# Patient Record
Sex: FEMALE | Race: WHITE | Employment: UNEMPLOYED | URBAN - METROPOLITAN AREA
[De-identification: names, ages, dates, MRNs, and addresses within clinical notes are randomized per-mention and may not be internally consistent; named-entity substitution may affect disease eponyms.]

---
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
## 2025-06-27 ENCOUNTER — HOSPITAL ENCOUNTER (EMERGENCY)
Age: 20
Discharge: HOME | End: 2025-06-28
Attending: STUDENT IN AN ORGANIZED HEALTH CARE EDUCATION/TRAINING PROGRAM
Payer: COMMERCIAL

## 2025-06-27 ENCOUNTER — APPOINTMENT (OUTPATIENT)
Dept: GENERAL RADIOLOGY | Age: 20
End: 2025-06-27
Payer: COMMERCIAL

## 2025-06-27 DIAGNOSIS — S83.004A CLOSED DISLOCATION OF RIGHT PATELLA, INITIAL ENCOUNTER: Primary | ICD-10-CM

## 2025-06-27 PROCEDURE — 99283 EMERGENCY DEPT VISIT LOW MDM: CPT

## 2025-06-27 PROCEDURE — 6360000002 HC RX W HCPCS

## 2025-06-27 PROCEDURE — 27562 TREAT KNEECAP DISLOCATION: CPT

## 2025-06-27 PROCEDURE — 96372 THER/PROPH/DIAG INJ SC/IM: CPT

## 2025-06-27 PROCEDURE — 73562 X-RAY EXAM OF KNEE 3: CPT

## 2025-06-27 PROCEDURE — 73502 X-RAY EXAM HIP UNI 2-3 VIEWS: CPT

## 2025-06-27 PROCEDURE — 99284 EMERGENCY DEPT VISIT MOD MDM: CPT

## 2025-06-27 RX ORDER — KETOROLAC TROMETHAMINE 30 MG/ML
30 INJECTION, SOLUTION INTRAMUSCULAR; INTRAVENOUS ONCE
Status: COMPLETED | OUTPATIENT
Start: 2025-06-27 | End: 2025-06-27

## 2025-06-27 RX ORDER — FENTANYL CITRATE 0.05 MG/ML
50 INJECTION, SOLUTION INTRAMUSCULAR; INTRAVENOUS ONCE
Status: DISCONTINUED | OUTPATIENT
Start: 2025-06-27 | End: 2025-06-27

## 2025-06-27 RX ADMIN — KETOROLAC TROMETHAMINE 30 MG: 30 INJECTION, SOLUTION INTRAMUSCULAR at 23:24

## 2025-06-27 ASSESSMENT — LIFESTYLE VARIABLES
HOW MANY STANDARD DRINKS CONTAINING ALCOHOL DO YOU HAVE ON A TYPICAL DAY: PATIENT DOES NOT DRINK
HOW OFTEN DO YOU HAVE A DRINK CONTAINING ALCOHOL: NEVER

## 2025-06-27 ASSESSMENT — PAIN SCALES - GENERAL: PAINLEVEL_OUTOF10: 10

## 2025-06-27 ASSESSMENT — PAIN DESCRIPTION - ORIENTATION: ORIENTATION: RIGHT

## 2025-06-27 ASSESSMENT — PAIN DESCRIPTION - LOCATION: LOCATION: KNEE

## 2025-06-27 ASSESSMENT — PAIN DESCRIPTION - DESCRIPTORS: DESCRIPTORS: DISCOMFORT;TIGHTNESS

## 2025-06-28 ENCOUNTER — APPOINTMENT (OUTPATIENT)
Dept: GENERAL RADIOLOGY | Age: 20
End: 2025-06-28
Payer: COMMERCIAL

## 2025-06-28 VITALS
SYSTOLIC BLOOD PRESSURE: 128 MMHG | HEIGHT: 61 IN | BODY MASS INDEX: 33.99 KG/M2 | WEIGHT: 180 LBS | TEMPERATURE: 97.8 F | OXYGEN SATURATION: 100 % | RESPIRATION RATE: 18 BRPM | DIASTOLIC BLOOD PRESSURE: 64 MMHG | HEART RATE: 78 BPM

## 2025-06-28 PROCEDURE — 6370000000 HC RX 637 (ALT 250 FOR IP)

## 2025-06-28 PROCEDURE — 73562 X-RAY EXAM OF KNEE 3: CPT

## 2025-06-28 PROCEDURE — 27562 TREAT KNEECAP DISLOCATION: CPT

## 2025-06-28 PROCEDURE — 96372 THER/PROPH/DIAG INJ SC/IM: CPT

## 2025-06-28 PROCEDURE — 6360000002 HC RX W HCPCS

## 2025-06-28 RX ORDER — MIDAZOLAM HYDROCHLORIDE 5 MG/ML
5 INJECTION, SOLUTION INTRAMUSCULAR; INTRAVENOUS ONCE
Status: COMPLETED | OUTPATIENT
Start: 2025-06-28 | End: 2025-06-28

## 2025-06-28 RX ORDER — FENTANYL CITRATE 50 UG/ML
INJECTION, SOLUTION INTRAMUSCULAR; INTRAVENOUS
Status: DISCONTINUED
Start: 2025-06-28 | End: 2025-06-28 | Stop reason: HOSPADM

## 2025-06-28 RX ORDER — ONDANSETRON 4 MG/1
4 TABLET, ORALLY DISINTEGRATING ORAL ONCE
Status: COMPLETED | OUTPATIENT
Start: 2025-06-28 | End: 2025-06-28

## 2025-06-28 RX ORDER — FENTANYL CITRATE 0.05 MG/ML
50 INJECTION, SOLUTION INTRAMUSCULAR; INTRAVENOUS ONCE
Status: COMPLETED | OUTPATIENT
Start: 2025-06-28 | End: 2025-06-28

## 2025-06-28 RX ADMIN — ONDANSETRON 4 MG: 4 TABLET, ORALLY DISINTEGRATING ORAL at 01:13

## 2025-06-28 RX ADMIN — FENTANYL CITRATE 50 MCG: 50 INJECTION INTRAMUSCULAR; INTRAVENOUS at 00:51

## 2025-06-28 RX ADMIN — MIDAZOLAM 5 MG: 5 INJECTION INTRAMUSCULAR; INTRAVENOUS at 01:13

## 2025-06-28 ASSESSMENT — PAIN DESCRIPTION - LOCATION
LOCATION: KNEE
LOCATION: KNEE

## 2025-06-28 ASSESSMENT — PAIN SCALES - GENERAL
PAINLEVEL_OUTOF10: 10
PAINLEVEL_OUTOF10: 2

## 2025-06-28 ASSESSMENT — PAIN DESCRIPTION - DESCRIPTORS: DESCRIPTORS: DISCOMFORT

## 2025-06-28 ASSESSMENT — PAIN - FUNCTIONAL ASSESSMENT
PAIN_FUNCTIONAL_ASSESSMENT: PREVENTS OR INTERFERES SOME ACTIVE ACTIVITIES AND ADLS
PAIN_FUNCTIONAL_ASSESSMENT: PREVENTS OR INTERFERES WITH ALL ACTIVE AND SOME PASSIVE ACTIVITIES

## 2025-06-28 ASSESSMENT — PAIN DESCRIPTION - ORIENTATION
ORIENTATION: RIGHT
ORIENTATION: RIGHT

## 2025-06-28 NOTE — ED NOTES
Safe EnvironmentArm Bands On: Fall; IDPatient has limb restriction?: NoSafety Measures: Standard Safety Measures; Bed/Chair alarm on; Bed/Chair-Wheels locked; Bed in low position; Call light within reach; Overbed table w/in reach; Gripper socks; Side rails X 2; Caregiver at bedside  Fall Risk InterventionsToilet Every 2 Hours-In Advance of Need: YesHourly Visual Checks: In bed; Agitated; AwakeRoom Door Open: YesPatient Moved Closer to Nursing Station: NoFall Visual Posted: Armband; Fall sign posted; SocksAlarm On: BedNursing Judgement-Fall Risk High(Add Comments): Yes  PrecautionsPrecautions: Fall riskNegative Pressure Room: NoPositive Pressure Room: No  Family/Significant Other CommunicationFamily/Significant Other Update: Updated; Visiting  Comfort and Environment InterventionsComfort: Lights dim  Safety CompanionAlternatives to Sitter: Increased Frequency of Nursing Rounds; Decrease Stimulation; Comfort Measures; Eliminate Invasive Treatment  Restart TimerAutomatic Restart Rounding Timer: Yes  NutritionNPO: NoFeeding: Able to feed self - Independent  TelemetryCardiac/Telemetry Monitor On: No  MobilityActivity: In bedHead of Bed Elevated : Self regulatedTurned/Repositioned: Turns self

## 2025-06-28 NOTE — DISCHARGE INSTRUCTIONS
XR KNEE RIGHT (3 VIEWS)   Final Result   1. Lateral subluxation of the patella.   2. Large knee joint effusion.         XR KNEE RIGHT (3 VIEWS)   Final Result   Lateral patellar dislocation.      RECOMMENDATIONS:   Post reduction views.         XR HIP RIGHT (2-3 VIEWS)   Final Result   No acute abnormality of the hip.         -Alternate between 600 mg of ibuprofen and 1000 mg of Tylenol every 4 hours  - Return to the emergency department with any new or concerning symptoms  - Follow-up with orthopedics as soon as possible for reevaluation.

## 2025-06-28 NOTE — ED NOTES
Ace bandage and knee immobilizer applied.  Learning assessment done; pt educated on use of knee immobilizer and crutches - able to teach back and demonstrate safe use.

## 2025-06-28 NOTE — ED NOTES
Patient verbally aggressive with staff, using profanity and screaming that no one is going to touch her leg; pt is refusing care at this time - provider aware.

## 2025-06-28 NOTE — ED NOTES
Patient continues to be verbally aggressive with staff, using profanity and screaming that no one is going to touch her leg; pt refusing X-rays  - staff continues to try and educate the patient and her mother on the risks of waiting to treat the patient's injury. Provider aware of patient's behavioral issues.

## 2025-06-28 NOTE — ED PROVIDER NOTES
Shared SMITA-ED Attending Visit.  CC: No     [facility]  Department of Emergency Medicine   ED  Encounter Note  Admit Date/RoomTime: 2025 10:04 PM  ED Room:     NAME: Tamar Montero  : 2005  MRN: [id number]     Chief Complaint:  Fall (Patient tripped over dog leash at home and injury her right knee, patient admits to dislocating her knee in the past)    History of Present Illness       Tamar Montero is a 19 y.o. old female who presents to the emergency department by private vehicle, for traumatic pain located to right knee  which occured several minute(s) prior to arrival.  Complaint is due to a twisting and hyperextension injury.  Patient fell over a dog leash at home.  She has dislocated this knee in the past.  She feels that she once again dislocated knee.  She states that she straightened out her leg at home and kneecap did straighten out a little bit but is still off to the side.  Her pain is aggraveated by any movement or pressure on or palpation of painful area and relieved by nothing, as no treatment has been provided prior to this visit. Her weight bearing ability is: not possible secondary to pain. She denies any head injury, headache, loss of consciousness, confusion, dizziness, neck pain, chest pain, abdominal pain, back pain, numbness, weakness, blurred vision, nausea, vomiting, fever, chills, wounds, or rash.  On assessment, patient is in pain, she is nontoxic-appearing.  GCS is 15.    ROS   Pertinent positives and negatives are stated within HPI, all other systems reviewed and are negative.    Past Medical History:  has a past medical history of Anxiety.    Surgical History:  has no past surgical history on file.    Social History:  reports that she has never smoked. She has never used smokeless tobacco. She reports that she does not drink alcohol and does not use drugs.    Family History: family history is not on file.     Allergies: Patient has no known allergies.    Physical